# Patient Record
Sex: FEMALE | Race: WHITE | NOT HISPANIC OR LATINO | Employment: FULL TIME | URBAN - METROPOLITAN AREA
[De-identification: names, ages, dates, MRNs, and addresses within clinical notes are randomized per-mention and may not be internally consistent; named-entity substitution may affect disease eponyms.]

---

## 2017-01-03 DIAGNOSIS — Z12.11 SCREEN FOR COLON CANCER: Primary | ICD-10-CM

## 2017-01-03 RX ORDER — SODIUM CHLORIDE 0.9 % (FLUSH) 0.9 %
1-10 SYRINGE (ML) INJECTION AS NEEDED
Status: CANCELLED | OUTPATIENT
Start: 2017-01-03

## 2017-02-20 ENCOUNTER — TELEPHONE (OUTPATIENT)
Dept: GASTROENTEROLOGY | Facility: CLINIC | Age: 67
End: 2017-02-20

## 2017-02-20 DIAGNOSIS — Z13.9 SCREENING: ICD-10-CM

## 2017-02-20 DIAGNOSIS — R10.13 DYSPEPSIA: Primary | ICD-10-CM

## 2017-02-20 RX ORDER — SODIUM CHLORIDE 0.9 % (FLUSH) 0.9 %
1-10 SYRINGE (ML) INJECTION AS NEEDED
Status: CANCELLED | OUTPATIENT
Start: 2017-02-20

## 2017-02-20 NOTE — TELEPHONE ENCOUNTER
----- Message from Larry Gutierrez sent at 2/20/2017  3:29 PM EST -----  Regarding: PT CALLED ABOUT GETTING EGD DONE AS WELL  Contact: [phone]   PT CALLED,HAS SCOPE DONE FEB 27 & WOULD LIKE TO GET EGD DONE AS WELL.

## 2017-02-20 NOTE — TELEPHONE ENCOUNTER
Called pt and pt reports she is having heartburn, throat pain, and still belching.  She is asking if she can have an egd be added to her c/s on 02/27.  Advised would send message to Dr Garcia.   Pt verb understanding.

## 2017-02-27 ENCOUNTER — HOSPITAL ENCOUNTER (OUTPATIENT)
Facility: HOSPITAL | Age: 67
Setting detail: HOSPITAL OUTPATIENT SURGERY
Discharge: HOME | End: 2017-02-27
Attending: INTERNAL MEDICINE | Admitting: INTERNAL MEDICINE

## 2017-02-27 ENCOUNTER — ANESTHESIA (OUTPATIENT)
Dept: GASTROENTEROLOGY | Facility: HOSPITAL | Age: 67
End: 2017-02-27

## 2017-02-27 ENCOUNTER — ANESTHESIA EVENT (OUTPATIENT)
Dept: GASTROENTEROLOGY | Facility: HOSPITAL | Age: 67
End: 2017-02-27

## 2017-02-27 VITALS
OXYGEN SATURATION: 97 % | DIASTOLIC BLOOD PRESSURE: 73 MMHG | RESPIRATION RATE: 16 BRPM | BODY MASS INDEX: 30.3 KG/M2 | TEMPERATURE: 97.9 F | HEIGHT: 63 IN | WEIGHT: 171 LBS | HEART RATE: 76 BPM | SYSTOLIC BLOOD PRESSURE: 134 MMHG

## 2017-02-27 DIAGNOSIS — R10.13 DYSPEPSIA: ICD-10-CM

## 2017-02-27 DIAGNOSIS — Z12.11 SCREEN FOR COLON CANCER: ICD-10-CM

## 2017-02-27 PROCEDURE — 88305 TISSUE EXAM BY PATHOLOGIST: CPT | Performed by: INTERNAL MEDICINE

## 2017-02-27 PROCEDURE — 25010000002 PROPOFOL 10 MG/ML EMULSION: Performed by: ANESTHESIOLOGY

## 2017-02-27 PROCEDURE — 43239 EGD BIOPSY SINGLE/MULTIPLE: CPT | Performed by: INTERNAL MEDICINE

## 2017-02-27 PROCEDURE — 87081 CULTURE SCREEN ONLY: CPT | Performed by: INTERNAL MEDICINE

## 2017-02-27 PROCEDURE — 88312 SPECIAL STAINS GROUP 1: CPT | Performed by: INTERNAL MEDICINE

## 2017-02-27 PROCEDURE — 45380 COLONOSCOPY AND BIOPSY: CPT | Performed by: INTERNAL MEDICINE

## 2017-02-27 RX ORDER — SODIUM CHLORIDE 0.9 % (FLUSH) 0.9 %
1-10 SYRINGE (ML) INJECTION AS NEEDED
Status: DISCONTINUED | OUTPATIENT
Start: 2017-02-27 | End: 2017-02-27 | Stop reason: HOSPADM

## 2017-02-27 RX ORDER — PROPOFOL 10 MG/ML
VIAL (ML) INTRAVENOUS CONTINUOUS PRN
Status: DISCONTINUED | OUTPATIENT
Start: 2017-02-27 | End: 2017-02-27 | Stop reason: SURG

## 2017-02-27 RX ORDER — PROPOFOL 10 MG/ML
VIAL (ML) INTRAVENOUS AS NEEDED
Status: DISCONTINUED | OUTPATIENT
Start: 2017-02-27 | End: 2017-02-27 | Stop reason: SURG

## 2017-02-27 RX ORDER — SODIUM CHLORIDE, SODIUM LACTATE, POTASSIUM CHLORIDE, CALCIUM CHLORIDE 600; 310; 30; 20 MG/100ML; MG/100ML; MG/100ML; MG/100ML
30 INJECTION, SOLUTION INTRAVENOUS CONTINUOUS PRN
Status: DISCONTINUED | OUTPATIENT
Start: 2017-02-27 | End: 2017-02-27 | Stop reason: HOSPADM

## 2017-02-27 RX ORDER — OMEPRAZOLE 40 MG/1
40 CAPSULE, DELAYED RELEASE ORAL DAILY
COMMUNITY
End: 2017-04-03 | Stop reason: SDUPTHER

## 2017-02-27 RX ADMIN — PROPOFOL 250 MCG/KG/MIN: 10 INJECTION, EMULSION INTRAVENOUS at 16:30

## 2017-02-27 RX ADMIN — SODIUM CHLORIDE, POTASSIUM CHLORIDE, SODIUM LACTATE AND CALCIUM CHLORIDE 30 ML/HR: 600; 310; 30; 20 INJECTION, SOLUTION INTRAVENOUS at 15:29

## 2017-02-27 RX ADMIN — PROPOFOL 40 MG: 10 INJECTION, EMULSION INTRAVENOUS at 16:35

## 2017-02-27 RX ADMIN — PROPOFOL 20 MG: 10 INJECTION, EMULSION INTRAVENOUS at 16:45

## 2017-02-27 RX ADMIN — PROPOFOL 50 MG: 10 INJECTION, EMULSION INTRAVENOUS at 16:31

## 2017-02-27 RX ADMIN — PROPOFOL 30 MG: 10 INJECTION, EMULSION INTRAVENOUS at 16:40

## 2017-02-27 NOTE — ANESTHESIA PREPROCEDURE EVALUATION
Anesthesia Evaluation     Patient summary reviewed and Nursing notes reviewed   NPO Status: > 8 hours   Airway   Mallampati: II  TM distance: >3 FB  Neck ROM: full  no difficulty expected  Dental - normal exam     Pulmonary - negative pulmonary ROS and normal exam   Cardiovascular - negative cardio ROS and normal exam        Neuro/Psych- negative ROS  GI/Hepatic/Renal/Endo - negative ROS     Musculoskeletal (-) negative ROS    Abdominal  - normal exam   Substance History - negative use     OB/GYN negative ob/gyn ROS         Other                                    Anesthesia Plan    ASA 2     MAC     intravenous induction   Anesthetic plan and risks discussed with patient.

## 2017-02-27 NOTE — ANESTHESIA POSTPROCEDURE EVALUATION
Patient: Jeimy Cifuentes    Procedure Summary     Date Anesthesia Start Anesthesia Stop Room / Location    02/27/17 [id number]  [facility]       Procedure Diagnosis Surgeon Provider    COLONOSCOPY TO CECUM/TI WITH POLYPECTOMYX 4 (COLD BX) (N/A ); ESOPHAGOGASTRODUODENOSCOPY WITH BIOPSY (N/A Esophagus) Screen for colon cancer  (Screen for colon cancer [Z12.11]) MD Sergio Jovel MD          Anesthesia Type: MAC  Last vitals  BP      Temp      Pulse     Resp      SpO2        Post Anesthesia Care and Evaluation    Patient location during evaluation: bedside  Patient participation: complete - patient participated  Level of consciousness: awake  Pain score: 1  Pain management: adequate  Airway patency: patent  Anesthetic complications: No anesthetic complications    Cardiovascular status: acceptable  Respiratory status: acceptable  Hydration status: acceptable

## 2017-02-28 LAB — UREASE TISS QL: NEGATIVE

## 2017-03-01 ENCOUNTER — TELEPHONE (OUTPATIENT)
Dept: GASTROENTEROLOGY | Facility: CLINIC | Age: 67
End: 2017-03-01

## 2017-03-01 LAB
CYTO UR: NORMAL
LAB AP CASE REPORT: NORMAL
Lab: NORMAL
PATH REPORT.FINAL DX SPEC: NORMAL
PATH REPORT.GROSS SPEC: NORMAL

## 2017-03-01 NOTE — TELEPHONE ENCOUNTER
Tell her that path from the EGD looked good. The colon polyps that were removed were not cancerous and not precancerous. I recommend a repeat c/s in 10 yrs.       It sounds like the Carafate Elixir that I prescribed is quite expensive. We could try prescribing Carafate pills 1 gm, and she could crush the carafate pill and mix in 20 cc of water and drink that AC and hs and see how it works (prescribe #120 with 11 refills). The Carafate pills are usually much cheaper than the Carafate Elixir. If she is game to try this then please call in a prescription to her pharmacy. kevin

## 2017-03-01 NOTE — TELEPHONE ENCOUNTER
----- Message from Jeimy Cifuentes sent at 3/1/2017  1:39 PM EST -----  Regarding: Visit Follow-Up Question  Contact: [phone]  Dr Garcia,  I don't see the lab results for the polyps.  Will they be on the chart?  The medicine you prescribed for my hernia is $375.00 a month WITH insurance.  Is there another medicine I can use?  I will get this medicine if need be but if there is another, I prefer not to spend that much money?  I remember something about 2 weeks and 4 weeks but I don't know what.  Am I suppose to make an appointment with you?

## 2017-03-02 RX ORDER — SUCRALFATE 1 G/1
TABLET ORAL
Qty: 120 TABLET | Refills: 11 | Status: SHIPPED | OUTPATIENT
Start: 2017-03-02

## 2017-03-02 NOTE — TELEPHONE ENCOUNTER
Call from pt.  Advise per Dr Garcia that path from the EGD looked good.  The colon polyps that were removed were not cancerous and not precancerous.  A repeat c/s in 10 yrs is recommended.    Re: expensive Carafate Elixir - Dr Garcia states we could try crushing Carafate pill, mixing with 20 cc water and drink before meals and at bedtime.  Carafate pills are usually much cheaper than the Elixir.  Pt verb understanding and states game to try.  Escribe complete as per Dr Garcia order.      Message to Christi VILLANUEVA for 2/28/22 c/s recall.

## 2017-04-03 ENCOUNTER — OFFICE VISIT (OUTPATIENT)
Dept: GASTROENTEROLOGY | Facility: CLINIC | Age: 67
End: 2017-04-03

## 2017-04-03 VITALS
WEIGHT: 175.6 LBS | SYSTOLIC BLOOD PRESSURE: 130 MMHG | BODY MASS INDEX: 31.11 KG/M2 | HEIGHT: 63 IN | DIASTOLIC BLOOD PRESSURE: 86 MMHG

## 2017-04-03 DIAGNOSIS — K21.9 GASTROESOPHAGEAL REFLUX DISEASE WITHOUT ESOPHAGITIS: Primary | ICD-10-CM

## 2017-04-03 PROCEDURE — 99213 OFFICE O/P EST LOW 20 MIN: CPT | Performed by: NURSE PRACTITIONER

## 2017-04-03 RX ORDER — OMEPRAZOLE 40 MG/1
40 CAPSULE, DELAYED RELEASE ORAL DAILY
Qty: 30 CAPSULE | Refills: 11 | Status: SHIPPED | OUTPATIENT
Start: 2017-04-03

## 2017-04-03 NOTE — PROGRESS NOTES
"Chief Complaint   Patient presents with   • Follow-up     from scopes       Jeimy Cifuentes is a  66 y.o. female here for a follow up visit post-endoscopy     HPI    66-year-old female patient seen by Dr. Garcia for open access colonoscopy and EGD.  Colonoscopy done for screening with no complaints of abdominal pain, changes in bowel habits, bright red blood or any melena.  EGD was performed for complaints of epigastric discomfort, indigestion and some esophageal returning.  Patient presents today for follow-up post endoscopy.    EGD performed on February 27 with findings of a medium-sized hiatal hernia and gastritis with negative pathology.  Patient had been placed on PPI by her ENT prior to scopes and has had some improvement in her symptoms.  She was prescribed Carafate which she has used with continued improvement.  She did notice some constipation associated with use of the medication and has back down on medication usage since her symptoms have improved.  She denies any dysphagia, odynophagia and her weight has been stable.    Colonoscopy performed for routine screening with findings of diverticular disease, internal hemorrhoids and polyps with negative pathology.  Per Dr. Garcia's note she is in recall for a 10 year follow-up    Past Medical History:   Diagnosis Date   • Arthritis    • Depression     \"mild\"   • History of anemia    • Personal history of (corrected) cleft lip and palate    • PONV (postoperative nausea and vomiting)        Current Outpatient Prescriptions   Medication Sig Dispense Refill   • aspirin 81 MG tablet Take 81 mg by mouth Daily.     • KRILL OIL PO Take  by mouth Daily.     • MULTIPLE VITAMIN PO Take 1 tablet by mouth Daily.     • omeprazole (priLOSEC) 40 MG capsule Take 1 capsule by mouth Daily. 30 capsule 11   • Probiotic Product (PROBIOTIC DAILY PO) Take  by mouth Daily.     • sucralfate (CARAFATE) 1 G tablet Crush and mix with 20 cc water take before meals and at bedtime 120 tablet 11 " "  • TRAZODONE HCL PO Take  by mouth As Needed.       No current facility-administered medications for this visit.        PRN Meds:.    No Known Allergies    Social History     Social History   • Marital status: Single     Spouse name: N/A   • Number of children: N/A   • Years of education: N/A     Occupational History   • Not on file.     Social History Main Topics   • Smoking status: Former Smoker     Years: 15.00     Quit date: 1995   • Smokeless tobacco: Not on file   • Alcohol use No   • Drug use: No   • Sexual activity: Defer     Other Topics Concern   • Not on file     Social History Narrative       History reviewed. No pertinent family history.    Review of Systems   Constitutional: Negative for activity change, appetite change and unexpected weight change.   HENT: Negative for trouble swallowing.    Respiratory: Negative for cough, choking and chest tightness.    Gastrointestinal: Negative for abdominal distention, abdominal pain, blood in stool, constipation, diarrhea, nausea and vomiting.   Allergic/Immunologic: Negative for immunocompromised state.       Vitals:    04/03/17 1143   BP: 130/86     /86  Ht 63\" (160 cm)  Wt 175 lb 9.6 oz (79.7 kg)  BMI 31.11 kg/m2  Physical Exam   Constitutional: She is oriented to person, place, and time. She appears well-developed and well-nourished. No distress.   HENT:   Head: Normocephalic and atraumatic.   Mouth/Throat: Oropharynx is clear and moist.   Eyes: No scleral icterus.   Neck: No thyromegaly present.   Cardiovascular: Normal rate and regular rhythm.    Pulmonary/Chest: Effort normal and breath sounds normal.   Abdominal: Soft. Bowel sounds are normal. She exhibits no distension. There is no tenderness.   Neurological: She is alert and oriented to person, place, and time.   Skin: Skin is warm and dry.   Psychiatric: She has a normal mood and affect.   Vitals reviewed.      ASSESSMENT AND PLAN    Jeimy was seen today for follow-up.    Diagnoses and all " orders for this visit:    Gastroesophageal reflux disease without esophagitis  Comments:  EGD with mild gastritis, neg path.  Continue  daily PPI, carafate prn    Other orders  -     omeprazole (priLOSEC) 40 MG capsule; Take 1 capsule by mouth Daily.      She will continue current PPI at daily dosing.  Refills were made available to the patient for one year.  She will follow up with our office at that time.  She was instructed to contact us sooner for any changes from today's assessment

## 2021-03-16 ENCOUNTER — BULK ORDERING (OUTPATIENT)
Dept: CASE MANAGEMENT | Facility: OTHER | Age: 71
End: 2021-03-16

## 2021-03-16 DIAGNOSIS — Z23 IMMUNIZATION DUE: ICD-10-CM

## 2021-09-09 ENCOUNTER — TELEPHONE (OUTPATIENT)
Dept: ORTHOPEDIC SURGERY | Facility: CLINIC | Age: 71
End: 2021-09-09

## (undated) DEVICE — Device: Brand: DEFENDO AIR/WATER/SUCTION AND BIOPSY VALVE

## (undated) DEVICE — THE TORRENT IRRIGATION SCOPE CONNECTOR IS USED WITH THE TORRENT IRRIGATION TUBING TO PROVIDE IRRIGATION FLUIDS SUCH AS STERILE WATER DURING GASTROINTESTINAL ENDOSCOPIC PROCEDURES WHEN USED IN CONJUNCTION WITH AN IRRIGATION PUMP (OR ELECTROSURGICAL UNIT).: Brand: TORRENT

## (undated) DEVICE — SINGLE-USE BIOPSY FORCEPS: Brand: RADIAL JAW 4

## (undated) DEVICE — TUBING, SUCTION, 1/4" X 10', STRAIGHT: Brand: MEDLINE

## (undated) DEVICE — BITEBLOCK OMNI BLOC

## (undated) DEVICE — CANN NASL CO2 TRULINK W/O2 A/